# Patient Record
Sex: MALE | Race: WHITE | NOT HISPANIC OR LATINO | Employment: FULL TIME | URBAN - METROPOLITAN AREA
[De-identification: names, ages, dates, MRNs, and addresses within clinical notes are randomized per-mention and may not be internally consistent; named-entity substitution may affect disease eponyms.]

---

## 2019-02-28 ENCOUNTER — HOSPITAL ENCOUNTER (OUTPATIENT)
Dept: SURGERY | Facility: HOSPITAL | Age: 84
Setting detail: HOSPITAL OUTPATIENT SURGERY
Discharge: HOME | End: 2019-02-28
Attending: OPHTHALMOLOGY

## 2019-03-07 ENCOUNTER — HOSPITAL ENCOUNTER (OUTPATIENT)
Dept: SURGERY | Facility: HOSPITAL | Age: 84
Setting detail: HOSPITAL OUTPATIENT SURGERY
Discharge: HOME | End: 2019-03-07
Attending: OPHTHALMOLOGY

## 2019-04-11 ENCOUNTER — HOSPITAL ENCOUNTER (OUTPATIENT)
Dept: LAB | Facility: HOSPITAL | Age: 84
Discharge: HOME | End: 2019-04-11
Attending: PHYSICIAN ASSISTANT

## 2019-04-11 LAB
ALBUMIN SERPL-MCNC: 4 G/DL (ref 3.5–5)
ALBUMIN/GLOB SERPL: 1.4 {RATIO} (ref 1.4–2.6)
ALP SERPL-CCNC: 68 U/L (ref 56–155)
ALT SERPL-CCNC: 18 U/L (ref 10–40)
ANION GAP SERPL CALC-SCNC: 16 MMOL/L (ref 8–19)
AST SERPL-CCNC: 21 U/L (ref 15–50)
BASOPHILS # BLD AUTO: 0.03 10*3/UL (ref 0–0.2)
BASOPHILS NFR BLD AUTO: 0.5 % (ref 0–3)
BILIRUB SERPL-MCNC: 0.64 MG/DL (ref 0.2–1.3)
BUN SERPL-MCNC: 18 MG/DL (ref 5–25)
BUN/CREAT SERPL: 19 {RATIO} (ref 6–20)
CALCIUM SERPL-MCNC: 9 MG/DL (ref 8.7–10.4)
CHLORIDE SERPL-SCNC: 100 MMOL/L (ref 99–111)
CONV ABS IMM GRAN: 0.02 10*3/UL (ref 0–0.2)
CONV CO2: 28 MMOL/L (ref 22–32)
CONV IMMATURE GRAN: 0.3 % (ref 0–1.8)
CONV TOTAL PROTEIN: 6.8 G/DL (ref 6.3–8.2)
CREAT UR-MCNC: 0.95 MG/DL (ref 0.7–1.2)
DEPRECATED RDW RBC AUTO: 48.2 FL (ref 35.1–43.9)
EOSINOPHIL # BLD AUTO: 0.1 10*3/UL (ref 0–0.7)
EOSINOPHIL # BLD AUTO: 1.5 % (ref 0–7)
ERYTHROCYTE [DISTWIDTH] IN BLOOD BY AUTOMATED COUNT: 13.1 % (ref 11.6–14.4)
GFR SERPLBLD BASED ON 1.73 SQ M-ARVRAT: >60 ML/MIN/{1.73_M2}
GLOBULIN UR ELPH-MCNC: 2.8 G/DL (ref 2–3.5)
GLUCOSE SERPL-MCNC: 79 MG/DL (ref 70–99)
HBA1C MFR BLD: 13.6 G/DL (ref 14–18)
HCT VFR BLD AUTO: 41.6 % (ref 42–52)
LYMPHOCYTES # BLD AUTO: 2.12 10*3/UL (ref 1–5)
MCH RBC QN AUTO: 32.7 PG (ref 27–31)
MCHC RBC AUTO-ENTMCNC: 32.7 G/DL (ref 33–37)
MCV RBC AUTO: 100 FL (ref 80–96)
MONOCYTES # BLD AUTO: 0.49 10*3/UL (ref 0.2–1.2)
MONOCYTES NFR BLD AUTO: 7.6 % (ref 3–10)
NEUTROPHILS # BLD AUTO: 3.7 10*3/UL (ref 2–8)
NEUTROPHILS NFR BLD AUTO: 57.3 % (ref 30–85)
NRBC CBCN: 0 % (ref 0–0.7)
OSMOLALITY SERPL CALC.SUM OF ELEC: 289 MOSM/KG (ref 273–304)
PLATELET # BLD AUTO: 218 10*3/UL (ref 130–400)
PMV BLD AUTO: 11.2 FL (ref 9.4–12.4)
POTASSIUM SERPL-SCNC: 4.6 MMOL/L (ref 3.5–5.3)
RBC # BLD AUTO: 4.16 10*6/UL (ref 4.7–6.1)
SODIUM SERPL-SCNC: 139 MMOL/L (ref 135–147)
T4 FREE SERPL-MCNC: 1.5 NG/DL (ref 0.9–1.8)
TSH SERPL-ACNC: 2.83 M[IU]/L (ref 0.27–4.2)
URATE SERPL-MCNC: 6.7 MG/DL (ref 3.5–8.5)
VARIANT LYMPHS NFR BLD MANUAL: 32.8 % (ref 20–45)
WBC # BLD AUTO: 6.46 10*3/UL (ref 4.8–10.8)

## 2019-07-22 ENCOUNTER — HOSPITAL ENCOUNTER (OUTPATIENT)
Dept: LAB | Facility: HOSPITAL | Age: 84
Discharge: HOME | End: 2019-07-22
Attending: PHYSICIAN ASSISTANT

## 2019-07-22 LAB
BASOPHILS # BLD AUTO: 0.03 10*3/UL (ref 0–0.2)
BASOPHILS NFR BLD AUTO: 0.6 % (ref 0–3)
CONV ABS IMM GRAN: 0.01 10*3/UL (ref 0–0.2)
CONV IMMATURE GRAN: 0.2 % (ref 0–1.8)
DEPRECATED RDW RBC AUTO: 48.6 FL (ref 35.1–43.9)
EOSINOPHIL # BLD AUTO: 0.12 10*3/UL (ref 0–0.7)
EOSINOPHIL # BLD AUTO: 2.2 % (ref 0–7)
ERYTHROCYTE [DISTWIDTH] IN BLOOD BY AUTOMATED COUNT: 13.4 % (ref 11.6–14.4)
FOLATE SERPL-MCNC: 14.5 NG/ML (ref 4.8–20)
HBA1C MFR BLD: 12.9 G/DL (ref 14–18)
HCT VFR BLD AUTO: 38.1 % (ref 42–52)
IRON SATN MFR SERPL: 30 % (ref 20–55)
IRON SERPL-MCNC: 92 UG/DL (ref 70–180)
LYMPHOCYTES # BLD AUTO: 1.84 10*3/UL (ref 1–5)
MCH RBC QN AUTO: 33.5 PG (ref 27–31)
MCHC RBC AUTO-ENTMCNC: 33.9 G/DL (ref 33–37)
MCV RBC AUTO: 99 FL (ref 80–96)
MONOCYTES # BLD AUTO: 0.5 10*3/UL (ref 0.2–1.2)
MONOCYTES NFR BLD AUTO: 9.4 % (ref 3–10)
NEUTROPHILS # BLD AUTO: 2.84 10*3/UL (ref 2–8)
NEUTROPHILS NFR BLD AUTO: 53.1 % (ref 30–85)
NRBC CBCN: 0 % (ref 0–0.7)
PLATELET # BLD AUTO: 188 10*3/UL (ref 130–400)
PMV BLD AUTO: 11.1 FL (ref 9.4–12.4)
RBC # BLD AUTO: 3.85 10*6/UL (ref 4.7–6.1)
TIBC SERPL-MCNC: 305 UG/DL (ref 245–450)
TRANSFERRIN SERPL-MCNC: 213 MG/DL (ref 215–365)
VARIANT LYMPHS NFR BLD MANUAL: 34.5 % (ref 20–45)
VIT B12 SERPL-MCNC: 412 PG/ML (ref 211–911)
WBC # BLD AUTO: 5.34 10*3/UL (ref 4.8–10.8)

## 2019-07-29 ENCOUNTER — HOSPITAL ENCOUNTER (OUTPATIENT)
Dept: LAB | Facility: HOSPITAL | Age: 84
Discharge: HOME | End: 2019-07-29
Attending: PHYSICIAN ASSISTANT

## 2019-08-26 ENCOUNTER — HOSPITAL ENCOUNTER (OUTPATIENT)
Dept: LAB | Facility: HOSPITAL | Age: 84
Discharge: HOME | End: 2019-08-26
Attending: PHYSICIAN ASSISTANT

## 2019-08-26 LAB
BASOPHILS # BLD AUTO: 0.04 10*3/UL (ref 0–0.2)
BASOPHILS NFR BLD AUTO: 0.6 % (ref 0–3)
CONV ABS IMM GRAN: 0.01 10*3/UL (ref 0–0.2)
CONV IMMATURE GRAN: 0.2 % (ref 0–1.8)
DEPRECATED RDW RBC AUTO: 47.4 FL (ref 35.1–43.9)
EOSINOPHIL # BLD AUTO: 0.28 10*3/UL (ref 0–0.7)
EOSINOPHIL # BLD AUTO: 4.4 % (ref 0–7)
ERYTHROCYTE [DISTWIDTH] IN BLOOD BY AUTOMATED COUNT: 13.2 % (ref 11.6–14.4)
HCT VFR BLD AUTO: 39.8 % (ref 42–52)
HGB BLD-MCNC: 13.2 G/DL (ref 14–18)
LYMPHOCYTES # BLD AUTO: 2.1 10*3/UL (ref 1–5)
LYMPHOCYTES NFR BLD AUTO: 32.7 % (ref 20–45)
MCH RBC QN AUTO: 32.8 PG (ref 27–31)
MCHC RBC AUTO-ENTMCNC: 33.2 G/DL (ref 33–37)
MCV RBC AUTO: 99 FL (ref 80–96)
MONOCYTES # BLD AUTO: 0.47 10*3/UL (ref 0.2–1.2)
MONOCYTES NFR BLD AUTO: 7.3 % (ref 3–10)
NEUTROPHILS # BLD AUTO: 3.52 10*3/UL (ref 2–8)
NEUTROPHILS NFR BLD AUTO: 54.8 % (ref 30–85)
NRBC CBCN: 0 % (ref 0–0.7)
PLATELET # BLD AUTO: 204 10*3/UL (ref 130–400)
PMV BLD AUTO: 11.2 FL (ref 9.4–12.4)
RBC # BLD AUTO: 4.02 10*6/UL (ref 4.7–6.1)
WBC # BLD AUTO: 6.42 10*3/UL (ref 4.8–10.8)

## 2019-10-10 ENCOUNTER — HOSPITAL ENCOUNTER (OUTPATIENT)
Dept: LAB | Facility: HOSPITAL | Age: 84
Discharge: HOME | End: 2019-10-10
Attending: PHYSICIAN ASSISTANT

## 2019-10-10 LAB
25(OH)D3 SERPL-MCNC: 54.5 NG/ML (ref 30–100)
ALBUMIN SERPL-MCNC: 4.2 G/DL (ref 3.5–5)
ALBUMIN/GLOB SERPL: 1.4 {RATIO} (ref 1.4–2.6)
ALP SERPL-CCNC: 82 U/L (ref 56–155)
ALT SERPL-CCNC: 24 U/L (ref 10–40)
ANION GAP SERPL CALC-SCNC: 16 MMOL/L (ref 8–19)
AST SERPL-CCNC: 23 U/L (ref 15–50)
BASOPHILS # BLD AUTO: 0.03 10*3/UL (ref 0–0.2)
BASOPHILS NFR BLD AUTO: 0.4 % (ref 0–3)
BILIRUB SERPL-MCNC: 0.91 MG/DL (ref 0.2–1.3)
BUN SERPL-MCNC: 14 MG/DL (ref 5–25)
BUN/CREAT SERPL: 14 {RATIO} (ref 6–20)
CALCIUM SERPL-MCNC: 9.5 MG/DL (ref 8.7–10.4)
CHLORIDE SERPL-SCNC: 100 MMOL/L (ref 99–111)
CHOLEST SERPL-MCNC: 109 MG/DL (ref 107–200)
CHOLEST/HDLC SERPL: 2.5 {RATIO} (ref 3–6)
CONV ABS IMM GRAN: 0.03 10*3/UL (ref 0–0.2)
CONV CO2: 27 MMOL/L (ref 22–32)
CONV IMMATURE GRAN: 0.4 % (ref 0–1.8)
CONV TOTAL PROTEIN: 7.2 G/DL (ref 6.3–8.2)
CREAT UR-MCNC: 1 MG/DL (ref 0.7–1.2)
DEPRECATED RDW RBC AUTO: 48.4 FL (ref 35.1–43.9)
EOSINOPHIL # BLD AUTO: 0.13 10*3/UL (ref 0–0.7)
EOSINOPHIL # BLD AUTO: 1.9 % (ref 0–7)
ERYTHROCYTE [DISTWIDTH] IN BLOOD BY AUTOMATED COUNT: 13.3 % (ref 11.6–14.4)
FOLATE SERPL-MCNC: 15 NG/ML (ref 4.8–20)
GFR SERPLBLD BASED ON 1.73 SQ M-ARVRAT: >60 ML/MIN/{1.73_M2}
GLOBULIN UR ELPH-MCNC: 3 G/DL (ref 2–3.5)
GLUCOSE SERPL-MCNC: 88 MG/DL (ref 70–99)
HCT VFR BLD AUTO: 41.1 % (ref 42–52)
HDLC SERPL-MCNC: 44 MG/DL (ref 40–60)
HGB BLD-MCNC: 13.7 G/DL (ref 14–18)
IRON SATN MFR SERPL: 48 % (ref 20–55)
IRON SERPL-MCNC: 149 UG/DL (ref 70–180)
LDLC SERPL CALC-MCNC: 50 MG/DL (ref 70–100)
LYMPHOCYTES # BLD AUTO: 2.34 10*3/UL (ref 1–5)
LYMPHOCYTES NFR BLD AUTO: 34.1 % (ref 20–45)
MCH RBC QN AUTO: 33 PG (ref 27–31)
MCHC RBC AUTO-ENTMCNC: 33.3 G/DL (ref 33–37)
MCV RBC AUTO: 99 FL (ref 80–96)
MONOCYTES # BLD AUTO: 0.49 10*3/UL (ref 0.2–1.2)
MONOCYTES NFR BLD AUTO: 7.1 % (ref 3–10)
NEUTROPHILS # BLD AUTO: 3.84 10*3/UL (ref 2–8)
NEUTROPHILS NFR BLD AUTO: 56.1 % (ref 30–85)
NRBC CBCN: 0 % (ref 0–0.7)
OSMOLALITY SERPL CALC.SUM OF ELEC: 288 MOSM/KG (ref 273–304)
PLATELET # BLD AUTO: 221 10*3/UL (ref 130–400)
PMV BLD AUTO: 11 FL (ref 9.4–12.4)
POTASSIUM SERPL-SCNC: 4.4 MMOL/L (ref 3.5–5.3)
RBC # BLD AUTO: 4.15 10*6/UL (ref 4.7–6.1)
SODIUM SERPL-SCNC: 139 MMOL/L (ref 135–147)
T4 FREE SERPL-MCNC: 1.4 NG/DL (ref 0.9–1.8)
TIBC SERPL-MCNC: 313 UG/DL (ref 245–450)
TRANSFERRIN SERPL-MCNC: 219 MG/DL (ref 215–365)
TRIGL SERPL-MCNC: 74 MG/DL (ref 40–150)
TSH SERPL-ACNC: 3.68 M[IU]/L (ref 0.27–4.2)
VIT B12 SERPL-MCNC: 466 PG/ML (ref 211–911)
VLDLC SERPL-MCNC: 15 MG/DL (ref 5–37)
WBC # BLD AUTO: 6.86 10*3/UL (ref 4.8–10.8)

## 2020-06-12 ENCOUNTER — HOSPITAL ENCOUNTER (OUTPATIENT)
Dept: LAB | Facility: HOSPITAL | Age: 85
Discharge: HOME | End: 2020-06-12
Attending: PHYSICIAN ASSISTANT

## 2020-06-12 LAB
25(OH)D3 SERPL-MCNC: 43 NG/ML (ref 30–100)
ALBUMIN SERPL-MCNC: 4.1 G/DL (ref 3.5–5)
ALBUMIN/GLOB SERPL: 1.5 {RATIO} (ref 1.4–2.6)
ALP SERPL-CCNC: 84 U/L (ref 56–155)
ALT SERPL-CCNC: 15 U/L (ref 10–40)
ANION GAP SERPL CALC-SCNC: 16 MMOL/L (ref 8–19)
AST SERPL-CCNC: 20 U/L (ref 15–50)
BASOPHILS # BLD AUTO: 0.03 10*3/UL (ref 0–0.2)
BASOPHILS NFR BLD AUTO: 0.5 % (ref 0–3)
BILIRUB SERPL-MCNC: 0.71 MG/DL (ref 0.2–1.3)
BUN SERPL-MCNC: 27 MG/DL (ref 5–25)
BUN/CREAT SERPL: 25 {RATIO} (ref 6–20)
CALCIUM SERPL-MCNC: 9.1 MG/DL (ref 8.7–10.4)
CHLORIDE SERPL-SCNC: 103 MMOL/L (ref 99–111)
CHOLEST SERPL-MCNC: 107 MG/DL (ref 107–200)
CHOLEST/HDLC SERPL: 3 {RATIO} (ref 3–6)
CONV ABS IMM GRAN: 0.03 10*3/UL (ref 0–0.2)
CONV CO2: 24 MMOL/L (ref 22–32)
CONV IMMATURE GRAN: 0.5 % (ref 0–1.8)
CONV TOTAL PROTEIN: 6.9 G/DL (ref 6.3–8.2)
CREAT UR-MCNC: 1.07 MG/DL (ref 0.7–1.2)
DEPRECATED RDW RBC AUTO: 47.8 FL (ref 35.1–43.9)
EOSINOPHIL # BLD AUTO: 0.09 10*3/UL (ref 0–0.7)
EOSINOPHIL # BLD AUTO: 1.4 % (ref 0–7)
ERYTHROCYTE [DISTWIDTH] IN BLOOD BY AUTOMATED COUNT: 13.2 % (ref 11.6–14.4)
FOLATE SERPL-MCNC: 13.2 NG/ML (ref 4.8–20)
GFR SERPLBLD BASED ON 1.73 SQ M-ARVRAT: >60 ML/MIN/{1.73_M2}
GLOBULIN UR ELPH-MCNC: 2.8 G/DL (ref 2–3.5)
GLUCOSE SERPL-MCNC: 93 MG/DL (ref 70–99)
HCT VFR BLD AUTO: 40.4 % (ref 42–52)
HDLC SERPL-MCNC: 36 MG/DL (ref 40–60)
HGB BLD-MCNC: 13.4 G/DL (ref 14–18)
IRON SATN MFR SERPL: 32 % (ref 20–55)
IRON SERPL-MCNC: 99 UG/DL (ref 70–180)
LDLC SERPL CALC-MCNC: 61 MG/DL (ref 70–100)
LYMPHOCYTES # BLD AUTO: 2.07 10*3/UL (ref 1–5)
LYMPHOCYTES NFR BLD AUTO: 31.7 % (ref 20–45)
MCH RBC QN AUTO: 32.8 PG (ref 27–31)
MCHC RBC AUTO-ENTMCNC: 33.2 G/DL (ref 33–37)
MCV RBC AUTO: 98.8 FL (ref 80–96)
MONOCYTES # BLD AUTO: 0.47 10*3/UL (ref 0.2–1.2)
MONOCYTES NFR BLD AUTO: 7.2 % (ref 3–10)
NEUTROPHILS # BLD AUTO: 3.84 10*3/UL (ref 2–8)
NEUTROPHILS NFR BLD AUTO: 58.7 % (ref 30–85)
NRBC CBCN: 0 % (ref 0–0.7)
OSMOLALITY SERPL CALC.SUM OF ELEC: 293 MOSM/KG (ref 273–304)
PLATELET # BLD AUTO: 253 10*3/UL (ref 130–400)
PMV BLD AUTO: 10.8 FL (ref 9.4–12.4)
POTASSIUM SERPL-SCNC: 4.2 MMOL/L (ref 3.5–5.3)
RBC # BLD AUTO: 4.09 10*6/UL (ref 4.7–6.1)
SODIUM SERPL-SCNC: 139 MMOL/L (ref 135–147)
T4 FREE SERPL-MCNC: 1.4 NG/DL (ref 0.9–1.8)
TIBC SERPL-MCNC: 309 UG/DL (ref 245–450)
TRANSFERRIN SERPL-MCNC: 216 MG/DL (ref 215–365)
TRIGL SERPL-MCNC: 48 MG/DL (ref 40–150)
TSH SERPL-ACNC: 2.22 M[IU]/L (ref 0.27–4.2)
VIT B12 SERPL-MCNC: 395 PG/ML (ref 211–911)
VLDLC SERPL-MCNC: 10 MG/DL (ref 5–37)
WBC # BLD AUTO: 6.53 10*3/UL (ref 4.8–10.8)

## 2020-06-30 ENCOUNTER — HOSPITAL ENCOUNTER (OUTPATIENT)
Dept: OTHER | Facility: HOSPITAL | Age: 85
Discharge: HOME | End: 2020-06-30
Attending: INTERNAL MEDICINE

## 2020-06-30 LAB
FOLATE SERPL-MCNC: 12 NG/ML (ref 4.8–20)
VIT B12 SERPL-MCNC: 626 PG/ML (ref 211–911)

## 2020-12-03 ENCOUNTER — HOSPITAL ENCOUNTER (OUTPATIENT)
Dept: LAB | Facility: HOSPITAL | Age: 85
Discharge: HOME | End: 2020-12-03
Attending: PHYSICIAN ASSISTANT

## 2020-12-03 LAB
25(OH)D3 SERPL-MCNC: 52.2 NG/ML (ref 30–100)
ALBUMIN SERPL-MCNC: 4.1 G/DL (ref 3.5–5)
ALBUMIN/GLOB SERPL: 1.3 {RATIO} (ref 1.4–2.6)
ALP SERPL-CCNC: 121 U/L (ref 56–155)
ALT SERPL-CCNC: 29 U/L (ref 10–40)
ANION GAP SERPL CALC-SCNC: 15 MMOL/L (ref 8–19)
AST SERPL-CCNC: 30 U/L (ref 15–50)
BASOPHILS # BLD AUTO: 0.03 10*3/UL (ref 0–0.2)
BASOPHILS NFR BLD AUTO: 0.4 % (ref 0–3)
BILIRUB SERPL-MCNC: 0.78 MG/DL (ref 0.2–1.3)
BUN SERPL-MCNC: 20 MG/DL (ref 5–25)
BUN/CREAT SERPL: 19 {RATIO} (ref 6–20)
CALCIUM SERPL-MCNC: 9.7 MG/DL (ref 8.7–10.4)
CHLORIDE SERPL-SCNC: 100 MMOL/L (ref 99–111)
CHOLEST SERPL-MCNC: 95 MG/DL (ref 107–200)
CHOLEST/HDLC SERPL: 2.1 {RATIO} (ref 3–6)
CONV ABS IMM GRAN: 0.02 10*3/UL (ref 0–0.2)
CONV CO2: 28 MMOL/L (ref 22–32)
CONV IMMATURE GRAN: 0.3 % (ref 0–1.8)
CONV TOTAL PROTEIN: 7.3 G/DL (ref 6.3–8.2)
CREAT UR-MCNC: 1.06 MG/DL (ref 0.7–1.2)
DEPRECATED RDW RBC AUTO: 48.7 FL (ref 35.1–43.9)
EOSINOPHIL # BLD AUTO: 0.21 10*3/UL (ref 0–0.7)
EOSINOPHIL # BLD AUTO: 2.8 % (ref 0–7)
ERYTHROCYTE [DISTWIDTH] IN BLOOD BY AUTOMATED COUNT: 13.4 % (ref 11.6–14.4)
GFR SERPLBLD BASED ON 1.73 SQ M-ARVRAT: >60 ML/MIN/{1.73_M2}
GLOBULIN UR ELPH-MCNC: 3.2 G/DL (ref 2–3.5)
GLUCOSE SERPL-MCNC: 94 MG/DL (ref 70–99)
HCT VFR BLD AUTO: 42.7 % (ref 42–52)
HDLC SERPL-MCNC: 46 MG/DL (ref 40–60)
HGB BLD-MCNC: 14.1 G/DL (ref 14–18)
LDLC SERPL CALC-MCNC: 39 MG/DL (ref 70–100)
LYMPHOCYTES # BLD AUTO: 2.17 10*3/UL (ref 1–5)
LYMPHOCYTES NFR BLD AUTO: 29 % (ref 20–45)
MCH RBC QN AUTO: 32.9 PG (ref 27–31)
MCHC RBC AUTO-ENTMCNC: 33 G/DL (ref 33–37)
MCV RBC AUTO: 99.5 FL (ref 80–96)
MONOCYTES # BLD AUTO: 0.49 10*3/UL (ref 0.2–1.2)
MONOCYTES NFR BLD AUTO: 6.6 % (ref 3–10)
NEUTROPHILS # BLD AUTO: 4.55 10*3/UL (ref 2–8)
NEUTROPHILS NFR BLD AUTO: 60.9 % (ref 30–85)
NRBC CBCN: 0 % (ref 0–0.7)
OSMOLALITY SERPL CALC.SUM OF ELEC: 288 MOSM/KG (ref 273–304)
PLATELET # BLD AUTO: 294 10*3/UL (ref 130–400)
PMV BLD AUTO: 10.5 FL (ref 9.4–12.4)
POTASSIUM SERPL-SCNC: 4.5 MMOL/L (ref 3.5–5.3)
RBC # BLD AUTO: 4.29 10*6/UL (ref 4.7–6.1)
SODIUM SERPL-SCNC: 138 MMOL/L (ref 135–147)
T4 FREE SERPL-MCNC: 1.3 NG/DL (ref 0.9–1.8)
TRIGL SERPL-MCNC: 51 MG/DL (ref 40–150)
TSH SERPL-ACNC: 2.88 M[IU]/L (ref 0.27–4.2)
VLDLC SERPL-MCNC: 10 MG/DL (ref 5–37)
WBC # BLD AUTO: 7.47 10*3/UL (ref 4.8–10.8)

## 2020-12-03 PROCEDURE — 82607 VITAMIN B-12: CPT

## 2020-12-03 PROCEDURE — 82746 ASSAY OF FOLIC ACID SERUM: CPT

## 2020-12-04 ENCOUNTER — LAB REQUISITION (OUTPATIENT)
Dept: LAB | Facility: HOSPITAL | Age: 85
End: 2020-12-04

## 2020-12-04 DIAGNOSIS — Z00.00 ROUTINE GENERAL MEDICAL EXAMINATION AT A HEALTH CARE FACILITY: ICD-10-CM

## 2020-12-04 LAB
FOLATE SERPL-MCNC: 11.6 NG/ML (ref 4.78–24.2)
VIT B12 BLD-MCNC: 966 PG/ML (ref 211–946)

## 2021-01-01 ENCOUNTER — HOSPITAL ENCOUNTER (OUTPATIENT)
Dept: OTHER | Facility: HOSPITAL | Age: 86
Discharge: HOME | End: 2021-01-27
Attending: INTERNAL MEDICINE

## 2021-01-01 ENCOUNTER — TELEPHONE (OUTPATIENT)
Dept: FAMILY MEDICINE CLINIC | Facility: CLINIC | Age: 86
End: 2021-01-01

## 2021-01-01 ENCOUNTER — OFFICE VISIT (OUTPATIENT)
Dept: FAMILY MEDICINE CLINIC | Facility: CLINIC | Age: 86
End: 2021-01-01

## 2021-01-01 ENCOUNTER — OFFICE VISIT CONVERTED (OUTPATIENT)
Dept: ORTHOPEDIC SURGERY | Facility: CLINIC | Age: 86
End: 2021-01-01
Attending: ORTHOPAEDIC SURGERY

## 2021-01-01 ENCOUNTER — HOSPITAL ENCOUNTER (OUTPATIENT)
Dept: GENERAL RADIOLOGY | Facility: HOSPITAL | Age: 86
Discharge: HOME | End: 2021-10-12
Admitting: NURSE PRACTITIONER

## 2021-01-01 ENCOUNTER — HOSPITAL ENCOUNTER (OUTPATIENT)
Dept: GASTROENTEROLOGY | Facility: HOSPITAL | Age: 86
Discharge: HOME | End: 2021-02-10
Attending: PHYSICIAN ASSISTANT

## 2021-01-01 ENCOUNTER — LAB (OUTPATIENT)
Dept: LAB | Facility: HOSPITAL | Age: 86
End: 2021-01-01

## 2021-01-01 ENCOUNTER — HOSPITAL ENCOUNTER (OUTPATIENT)
Dept: URGENT CARE | Facility: CLINIC | Age: 86
Discharge: HOME | End: 2021-02-05
Attending: EMERGENCY MEDICINE

## 2021-01-01 ENCOUNTER — HOSPITAL ENCOUNTER (OUTPATIENT)
Dept: GENERAL RADIOLOGY | Facility: HOSPITAL | Age: 86
Discharge: HOME | End: 2021-03-08
Attending: PHYSICIAN ASSISTANT

## 2021-01-01 ENCOUNTER — CLINICAL SUPPORT (OUTPATIENT)
Dept: FAMILY MEDICINE CLINIC | Facility: CLINIC | Age: 86
End: 2021-01-01

## 2021-01-01 VITALS — HEART RATE: 88 BPM | HEIGHT: 74 IN | WEIGHT: 180 LBS | OXYGEN SATURATION: 96 % | BODY MASS INDEX: 23.1 KG/M2

## 2021-01-01 VITALS
HEART RATE: 84 BPM | OXYGEN SATURATION: 97 % | WEIGHT: 190.2 LBS | SYSTOLIC BLOOD PRESSURE: 130 MMHG | BODY MASS INDEX: 24.41 KG/M2 | HEIGHT: 74 IN | DIASTOLIC BLOOD PRESSURE: 70 MMHG | TEMPERATURE: 97.6 F

## 2021-01-01 VITALS
HEART RATE: 100 BPM | BODY MASS INDEX: 23.49 KG/M2 | RESPIRATION RATE: 20 BRPM | WEIGHT: 183 LBS | HEIGHT: 74 IN | DIASTOLIC BLOOD PRESSURE: 78 MMHG | OXYGEN SATURATION: 100 % | TEMPERATURE: 97.3 F | SYSTOLIC BLOOD PRESSURE: 140 MMHG

## 2021-01-01 DIAGNOSIS — I10 ESSENTIAL HYPERTENSION: Primary | ICD-10-CM

## 2021-01-01 DIAGNOSIS — E78.2 MIXED HYPERLIPIDEMIA: ICD-10-CM

## 2021-01-01 DIAGNOSIS — I10 ESSENTIAL HYPERTENSION: ICD-10-CM

## 2021-01-01 DIAGNOSIS — E55.9 VITAMIN D DEFICIENCY: ICD-10-CM

## 2021-01-01 DIAGNOSIS — M79.642 LEFT HAND PAIN: ICD-10-CM
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
## 2021-01-01 DIAGNOSIS — R53.82 CHRONIC FATIGUE: ICD-10-CM

## 2021-01-01 DIAGNOSIS — S62.652A CLOSED NONDISPLACED FRACTURE OF MIDDLE PHALANX OF RIGHT MIDDLE FINGER, INITIAL ENCOUNTER: ICD-10-CM

## 2021-01-01 DIAGNOSIS — Z23 NEED FOR INFLUENZA VACCINATION: Primary | ICD-10-CM

## 2021-01-01 DIAGNOSIS — M79.642 LEFT HAND PAIN: Primary | ICD-10-CM

## 2021-01-01 DIAGNOSIS — D50.9 IRON DEFICIENCY ANEMIA, UNSPECIFIED IRON DEFICIENCY ANEMIA TYPE: ICD-10-CM

## 2021-01-01 LAB
25(OH)D3 SERPL-MCNC: 43.5 NG/ML (ref 30–100)
ALBUMIN SERPL-MCNC: 4 G/DL (ref 3.5–5.2)
ALBUMIN/GLOB SERPL: 1.6 G/DL
ALP SERPL-CCNC: 70 U/L (ref 39–117)
ALT SERPL W P-5'-P-CCNC: 16 U/L (ref 1–41)
ANION GAP SERPL CALCULATED.3IONS-SCNC: 9.2 MMOL/L (ref 5–15)
AST SERPL-CCNC: 23 U/L (ref 1–40)
BASOPHILS # BLD AUTO: 0.03 10*3/MM3 (ref 0–0.2)
BASOPHILS NFR BLD AUTO: 0.6 % (ref 0–1.5)
BILIRUB SERPL-MCNC: 0.8 MG/DL (ref 0–1.2)
BUN SERPL-MCNC: 20 MG/DL (ref 8–23)
BUN/CREAT SERPL: 18.5 (ref 7–25)
CALCIUM SPEC-SCNC: 9.4 MG/DL (ref 8.6–10.5)
CHLORIDE SERPL-SCNC: 102 MMOL/L (ref 98–107)
CHOLEST SERPL-MCNC: 127 MG/DL (ref 0–200)
CO2 SERPL-SCNC: 27.8 MMOL/L (ref 22–29)
CREAT SERPL-MCNC: 1.08 MG/DL (ref 0.76–1.27)
DEPRECATED RDW RBC AUTO: 44.2 FL (ref 37–54)
EOSINOPHIL # BLD AUTO: 0.1 10*3/MM3 (ref 0–0.4)
EOSINOPHIL NFR BLD AUTO: 1.9 % (ref 0.3–6.2)
ERYTHROCYTE [DISTWIDTH] IN BLOOD BY AUTOMATED COUNT: 12.4 % (ref 12.3–15.4)
GFR SERPL CREATININE-BSD FRML MDRD: 65 ML/MIN/1.73
GLOBULIN UR ELPH-MCNC: 2.5 GM/DL
GLUCOSE SERPL-MCNC: 82 MG/DL (ref 65–99)
HCT VFR BLD AUTO: 38.6 % (ref 37.5–51)
HDLC SERPL-MCNC: 39 MG/DL (ref 40–60)
HGB BLD-MCNC: 13.3 G/DL (ref 13–17.7)
IMM GRANULOCYTES # BLD AUTO: 0.02 10*3/MM3 (ref 0–0.05)
IMM GRANULOCYTES NFR BLD AUTO: 0.4 % (ref 0–0.5)
IRON 24H UR-MRATE: 99 MCG/DL (ref 59–158)
LDLC SERPL CALC-MCNC: 75 MG/DL (ref 0–100)
LDLC/HDLC SERPL: 1.95 {RATIO}
LYMPHOCYTES # BLD AUTO: 1.92 10*3/MM3 (ref 0.7–3.1)
LYMPHOCYTES NFR BLD AUTO: 36 % (ref 19.6–45.3)
MCH RBC QN AUTO: 33.5 PG (ref 26.6–33)
MCHC RBC AUTO-ENTMCNC: 34.5 G/DL (ref 31.5–35.7)
MCV RBC AUTO: 97.2 FL (ref 79–97)
MONOCYTES # BLD AUTO: 0.36 10*3/MM3 (ref 0.1–0.9)
MONOCYTES NFR BLD AUTO: 6.7 % (ref 5–12)
NEUTROPHILS NFR BLD AUTO: 2.91 10*3/MM3 (ref 1.7–7)
NEUTROPHILS NFR BLD AUTO: 54.4 % (ref 42.7–76)
NRBC BLD AUTO-RTO: 0 /100 WBC (ref 0–0.2)
PLATELET # BLD AUTO: 218 10*3/MM3 (ref 140–450)
PMV BLD AUTO: 11 FL (ref 6–12)
POTASSIUM SERPL-SCNC: 4.8 MMOL/L (ref 3.5–5.2)
PROT SERPL-MCNC: 6.5 G/DL (ref 6–8.5)
RBC # BLD AUTO: 3.97 10*6/MM3 (ref 4.14–5.8)
SARS-COV-2 RNA SPEC QL NAA+PROBE: DETECTED
SODIUM SERPL-SCNC: 139 MMOL/L (ref 136–145)
T4 FREE SERPL-MCNC: 1.2 NG/DL (ref 0.93–1.7)
TRIGL SERPL-MCNC: 60 MG/DL (ref 0–150)
TSH SERPL DL<=0.05 MIU/L-ACNC: 1.86 UIU/ML (ref 0.27–4.2)
VIT B12 BLD-MCNC: 609 PG/ML (ref 211–946)
VLDLC SERPL-MCNC: 13 MG/DL (ref 5–40)
WBC # BLD AUTO: 5.34 10*3/MM3 (ref 3.4–10.8)

## 2021-01-01 PROCEDURE — G0008 ADMIN INFLUENZA VIRUS VAC: HCPCS | Performed by: FAMILY MEDICINE

## 2021-01-01 PROCEDURE — 80053 COMPREHEN METABOLIC PANEL: CPT

## 2021-01-01 PROCEDURE — 82306 VITAMIN D 25 HYDROXY: CPT

## 2021-01-01 PROCEDURE — 90662 IIV NO PRSV INCREASED AG IM: CPT | Performed by: FAMILY MEDICINE

## 2021-01-01 PROCEDURE — 84439 ASSAY OF FREE THYROXINE: CPT

## 2021-01-01 PROCEDURE — 99213 OFFICE O/P EST LOW 20 MIN: CPT | Performed by: NURSE PRACTITIONER

## 2021-01-01 PROCEDURE — 36415 COLL VENOUS BLD VENIPUNCTURE: CPT

## 2021-01-01 PROCEDURE — 80061 LIPID PANEL: CPT

## 2021-01-01 PROCEDURE — 73130 X-RAY EXAM OF HAND: CPT

## 2021-01-01 PROCEDURE — 99204 OFFICE O/P NEW MOD 45 MIN: CPT | Performed by: FAMILY MEDICINE

## 2021-01-01 PROCEDURE — 83540 ASSAY OF IRON: CPT

## 2021-01-01 PROCEDURE — 85025 COMPLETE CBC W/AUTO DIFF WBC: CPT

## 2021-01-01 PROCEDURE — 82607 VITAMIN B-12: CPT

## 2021-01-01 PROCEDURE — 84443 ASSAY THYROID STIM HORMONE: CPT

## 2021-01-01 RX ORDER — OMEPRAZOLE 20 MG/1
20 CAPSULE, DELAYED RELEASE ORAL DAILY
COMMUNITY
Start: 2021-01-01 | End: 2021-01-01 | Stop reason: SDUPTHER

## 2021-01-01 RX ORDER — ASPIRIN 81 MG/1
81 TABLET, CHEWABLE ORAL DAILY
Qty: 90 TABLET | Refills: 2 | Status: SHIPPED | OUTPATIENT
Start: 2021-01-01 | End: 2022-01-10

## 2021-01-01 RX ORDER — OMEPRAZOLE 20 MG/1
20 CAPSULE, DELAYED RELEASE ORAL DAILY
Qty: 90 CAPSULE | Refills: 1 | Status: SHIPPED | OUTPATIENT
Start: 2021-01-01

## 2021-01-01 RX ORDER — LOSARTAN POTASSIUM 25 MG/1
25 TABLET ORAL DAILY
Qty: 90 TABLET | Refills: 3 | Status: SHIPPED | OUTPATIENT
Start: 2021-01-01

## 2021-01-01 RX ORDER — ASPIRIN 81 MG/1
81 TABLET, CHEWABLE ORAL DAILY
COMMUNITY
Start: 2021-01-01 | End: 2021-01-01 | Stop reason: SDUPTHER

## 2021-01-01 RX ORDER — LOSARTAN POTASSIUM 25 MG/1
25 TABLET ORAL DAILY
COMMUNITY
Start: 2021-01-01 | End: 2021-01-01 | Stop reason: SDUPTHER

## 2021-05-10 NOTE — H&P
History and Physical      Patient Name: Mariposa Paredes   Patient ID: [id number]   Sex: Male   YOB: 1935    Primary Care Provider: Jose Brunner MD   Referring Provider: Coty Caldwell PA-C    Visit Date: March 30, 2021    Provider: Nayeli Kovacs MD   Location: [facility]   Location Address: [address] [place] [address]  [id number]   Location Phone: [phone]          Chief Complaint  · Right Shoulder Pain      History Of Present Illness  Mariposa Paredes is a 85 year old /White male who presents today to [facility].      Patient presents today for an evaluation of right shoulder pain. He presents today using a cane for ambulation assistance. He states he has been increasing right shoulder pain for the last several months on the anterior aspect of his shoulder that radiates down his upper arm. He states he has good range of motion in his right shoulder but can't get the pain to subside. 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 He states pain in his shoulder after receiving a vitamin B12 shot. He states pain has gotten so bad he has to squeeze his right shoulder at night. The only relief he gets is putting a heating pad on his shoulder. He states pain is made worse when he is laying down.          Past Medical History  Anemia; Diverticulosis; GERD; Hypertension         Past Surgical History  Cholecystecomy; Colonoscopy; Hand surgery         Medication List  losartan 50 mg oral tablet; omeprazole 20 mg oral capsule,delayed release(DR/EC)         Allergy List  PENICILLINS       Allergies Reconciled  Family Medical History  *No Known Family History         Social History  Alcohol (Never); Tobacco (Former)         Review of Systems  · Constitutional  o Denies  o : fever, chills, weight loss  · Cardiovascular  o Denies  o : chest pain, shortness of breath  · Gastrointestinal  o Denies  o : liver disease,  "heartburn, nausea, blood in stools  · Genitourinary  o Denies  o : painful urination, blood in urine  · Integument  o Denies  o : rash, itching  · Neurologic  o Denies  o : headache, weakness, loss of consciousness  · Musculoskeletal  o Denies  o : painful, swollen joints  · Psychiatric  o Denies  o : drug/alcohol addiction, anxiety, depression      Vitals  Date Time BP Position Site L\R Cuff Size HR RR TEMP (F) WT  HT  BMI kg/m2 BSA m2 O2 Sat FR L/min FiO2        03/30/2021 03:48 PM      88 - R   180lbs 0oz 6'  2\" 23.11 2.06 96 %            Physical Examination  · Constitutional  o Appearance  o : well developed, well-nourished, no obvious deformities present  · Head and Face  o Head  o :   § Inspection  § : normocephalic  o Face  o :   § Inspection  § : no facial lesions  · Eyes  o Conjunctivae  o : conjunctivae normal  o Sclerae  o : sclerae white  · Ears, Nose, Mouth and Throat  o Ears  o :   § External Ears  § : appearance within normal limits  § Hearing  § : intact  o Nose  o :   § External Nose  § : appearance normal  · Neck  o Inspection/Palpation  o : normal appearance  o Range of Motion  o : full range of motion  · Respiratory  o Respiratory Effort  o : breathing unlabored  o Inspection of Chest  o : normal appearance  o Auscultation of Lungs  o : no audible wheezing or rales  · Cardiovascular  o Heart  o : regular rate  · Gastrointestinal  o Abdominal Examination  o : soft and non-tender  · Skin and Subcutaneous Tissue  o General Inspection  o : intact, no rashes  · Psychiatric  o General  o : Alert and oriented x3  o Judgement and Insight  o : judgment and insight intact  o Mood and Affect  o : mood normal, affect appropriate  · Right Shoulder  o Inspection  o : Sensation grossly intact. Neurovascular intact. Tender AC joint. Good tone of deltoid, biceps, triceps, wrist extensors, and wrist flexors. No swelling or skin discoloration. Radial pulse 2+, ulnar pulse 2+. ER to 45. Pain with abduction. " Non-tender elbow. Tender subacromial bursa. Skin intact. Full forward flexion. Full cross body adduction.   · Injection Note/Aspiration Note  o Site  o : right shoulder  o Procedure  o : Procedure: After educating the patient, patient gave consent for procedure. After using Chloraprep, the joint space was injected. The patient tolerated the procedure well.   o Medication  o : 80 mg of DepoMedrol with 9cc of 1% Xylocaine  · Imaging  o Imaging  o : 3/8/21 RIGHT SHOULDER X-RAY: Mild age-appropriate DJD at the AC joint, without radiographic evidence of acute fracture or dislocation.           Assessment  · Right shoulder pain, unspecified chronicity     719.41/M25.511      Plan  · Orders  o Depo-Medrol injection 80mg () - - 03/30/2021   Lot [id number] Exp 02 2022 [employer] Administered by PRIMITIVO Kovacs MD  o Shoulder Intra-articular Injection without US Guidance [facility] ([id number]) - - 03/30/2021   Lot [id number] Exp 04 2024 [employer] Administered by PRIMITIVO Kovacs MD  · Medications  o Medications have been Reconciled  o Transition of Care or Provider Policy  · Instructions  o Dr. Kovacs saw and examined the patient and agrees with plan.   o X-rays reviewed by Dr. Kovacs.  o Reviewed the patient's Past Medical, Social, and Family history as well as the ROS at today's visit, no changes.  o Call or return if worsening symptoms.  o Follow Up PRN.  o Discussed treatment plans and diagnosis with the patient. Patient opted for a right shoulder injection and tolerated it well.   o The above service was scribed by Yaa Cifuentes on my behalf and I attest to the accuracy of the note. mc            Electronically Signed by: Yaa Cifuentes-, Other -Author on April 1, 2021 01:55:09 PM  Electronically Co-signed by: Nayeli Kovacs MD -Reviewer on April 1, 2021 08:53:10 PM

## 2021-07-20 PROBLEM — E55.9 VITAMIN D DEFICIENCY: Status: ACTIVE | Noted: 2021-01-01

## 2021-07-20 PROBLEM — D50.9 IRON DEFICIENCY ANEMIA: Status: ACTIVE | Noted: 2021-01-01

## 2021-07-20 PROBLEM — E78.2 MIXED HYPERLIPIDEMIA: Status: ACTIVE | Noted: 2021-01-01

## 2021-07-20 PROBLEM — I10 ESSENTIAL HYPERTENSION: Status: ACTIVE | Noted: 2021-01-01

## 2021-07-20 PROBLEM — R53.82 CHRONIC FATIGUE: Status: ACTIVE | Noted: 2021-01-01

## 2021-07-20 NOTE — PROGRESS NOTES
"Chief Complaint  Establish Care (new patient ) and labs (routine )    Subjective          Mariposa Paredes presents to [facility]  History of Present Illness    Presents establish [facility] is with his son has some complaints of fatigue but otherwise doing well has history of HTN heartburn and stroke on aspirin losartan blood pressure well controlled no chest pain palpitations headache fever shortness of breath or other      Objective   Vital Signs:   /70   Pulse 84   Temp 97.6 °F (36.4 °C) (Temporal)   Ht 188 cm (74\")   Wt 86.3 kg (190 lb 3.2 oz)   SpO2 97%   BMI 24.42 kg/m²     Physical Exam  Vitals reviewed.   Constitutional:       Appearance: Normal appearance. He is well-developed.   HENT:      Head: Normocephalic and atraumatic.      Right Ear: External ear normal.      Left Ear: External ear normal.      Mouth/Throat:      Pharynx: No oropharyngeal exudate.   Eyes:      Conjunctiva/sclera: Conjunctivae normal.      Pupils: Pupils are equal, round, and reactive to light.   Cardiovascular:      Rate and Rhythm: Normal rate and regular rhythm.      Heart sounds: No murmur heard.   No friction rub. No gallop.    Pulmonary:      Effort: Pulmonary effort is normal.      Breath sounds: Normal breath sounds. No wheezing or rhonchi.   Abdominal:      General: Bowel sounds are normal. There is no distension.      Palpations: Abdomen is soft.      Tenderness: There is no abdominal tenderness.   Skin:     General: Skin is warm and dry.   Neurological:      Mental Status: He is alert and oriented to person, place, and time.      Cranial Nerves: No cranial nerve deficit.   Psychiatric:         Mood and Affect: Mood and affect normal.         Behavior: Behavior normal.         Thought Content: Thought content normal.         Judgment: Judgment normal.        Result Review :   The following data was reviewed by: Ryne Waddell DO on 07/20/2021:  Common labs    Common Labsle 12/3/20 " 12/3/20 12/3/20    0858 0858 0858   Glucose  94    BUN  20    Creatinine  1.06    Sodium  138    Potassium  4.5    Chloride  100    Calcium  9.7    Albumin  4.1    Total Bilirubin  0.78    Alkaline Phosphatase  121    AST (SGOT)  30    ALT (SGPT)  29    WBC 7.47     Hemoglobin 14.1     Hematocrit 42.7     Platelets 294     Total Cholesterol   95 (A)   Triglycerides   51   HDL Cholesterol   46   LDL Cholesterol    39 (A)   (A) Abnormal value       Comments are available for some flowsheets but are not being displayed.                  Patient reports having a PSA on 10/2016 within normal limits colonoscopy due as above may need to be repeated will make sure he has had one left in years Pneumovax 2003 and 2015 Shingrix is recommended           Assessment and Plan    Diagnoses and all orders for this visit:    1. Essential hypertension (Primary)  -     Comprehensive metabolic panel; Future  -     CBC w AUTO Differential; Future    2. Mixed hyperlipidemia  -     Lipid panel; Future    3. Vitamin D deficiency  -     Vitamin D 25 hydroxy; Future    4. Chronic fatigue  -     Vitamin B12; Future  -     Comprehensive metabolic panel; Future  -     Iron level; Future  -     TSH+Free T4; Future  -     Lipid panel; Future  -     CBC w AUTO Differential; Future    5. Iron deficiency anemia, unspecified iron deficiency anemia type  -     Iron level; Future    Other orders  -     Cancel: Thyroid Profile II; Future    Patient up-to-date needs labs in the next 6 months as above has some fatigue above lab work should help with investigating sooner follow-up if needed precautions given monitor blood pressure at home and continue with activity we will review past chart and go from there        Follow Up   Return in about 6 months (around 1/20/2022), or if symptoms worsen or fail to improve, for Medicare Wellness if due , Labs before.  Patient was given instructions and counseling regarding his condition or for health maintenance  advice. Please see specific information pulled into the AVS if appropriate.

## 2021-10-12 NOTE — PATIENT INSTRUCTIONS
Hand Pain  Many things can cause hand pain. Some common causes are:  · An injury.  · Repeating the same movement with your hand over and over (overuse).  · Osteoporosis.  · Arthritis.  · Lumps in the tendons or joints of the hand and wrist (ganglion cysts).  · Nerve compression syndromes (carpal tunnel syndrome).  · Inflammation of the tendons (tendinitis).  · Infection.  Follow these instructions at home:  Pay attention to any changes in your symptoms. Take these actions to help with your discomfort:  Managing pain, stiffness, and swelling    · Take over-the-counter and prescription medicines only as told by your health care provider.  · Wear a hand splint or support as told by your health care provider.  · If directed, put ice on the affected area:  ? Put ice in a plastic bag.  ? Place a towel between your skin and the bag.  ? Leave the ice on for 20 minutes, 2-3 times a day.    Activity  · Take breaks from repetitive activity often.  · Avoid activities that make your pain worse.  · Minimize stress on your hands and wrists as much as possible.  · Do stretches or exercises as told by your health care provider.  · Do not do activities that make your pain worse.  Contact a health care provider if:  · Your pain does not get better after a few days of self-care.  · Your pain gets worse.  · Your pain affects your ability to do your daily activities.  Get help right away if:  · Your hand becomes warm, red, or swollen.  · Your hand is numb or tingling.  · Your hand is extremely swollen or deformed.  · Your hand or fingers turn white or blue.  · You cannot move your hand, wrist, or fingers.  Summary  · Many things can cause hand pain.  · Contact your health care provider if your pain does not get better after a few days of self care.  · Minimize stress on your hands and wrists as much as possible.  · Do not do activities that make your pain worse.  This information is not intended to replace advice given to you by your  health care provider. Make sure you discuss any questions you have with your health care provider.  Document Revised: 09/13/2019 Document Reviewed: 09/13/2019  [employer] Patient Education © 2021 [employer]

## 2021-10-12 NOTE — ASSESSMENT & PLAN NOTE
Hypertension is stable..  Continue current treatment regimen.  Dietary sodium restriction.  Regular aerobic exercise.  Continue current medications.  Blood pressure will be reassessed at the next regular appointment.

## 2021-10-12 NOTE — PROGRESS NOTES
"Chief Complaint   Patient presents with   • Hand Pain     Left middle finger swollen and sore after a fall on 10/10/21       Subjective          Mariposa Paredes presents to [facility]    Pt presents with L hand pain following fall 2 days ago. States he tripped in his yard while walking to a neighbors house. Has not taken anything for pain. Denies any other injuries. Denies LOC or head injury. L middle finger is painful/swollen.       Past History:  Medical History: has a past medical history of Anemia, Arthritis, Condition not found, Diverticulosis, GERD (gastroesophageal reflux disease), HTN (hypertension), Reflux, Seasonal allergies, SOB (shortness of breath), and Stroke (HCC).   Surgical History: has a past surgical history that includes Cholecystectomy; Colonoscopy (2005); Gallbladder surgery; Hand surgery; and Hernia repair.   Family History: family history includes Arthritis in his son.   Social History: reports that he has quit smoking. He has never used smokeless tobacco. He reports that he does not drink alcohol and does not use drugs.  Allergies: Penicillin g and Penicillins  (Not in a hospital admission)       Social History     Socioeconomic History   • Marital status:    Tobacco Use   • Smoking status: Former Smoker   • Smokeless tobacco: Never Used   Vaping Use   • Vaping Use: Never used   Substance and Sexual Activity   • Alcohol use: Never   • Drug use: Never   • Sexual activity: Defer       Health Maintenance Due   Topic Date Due   • ZOSTER VACCINE (1 of 2) Never done   • ANNUAL WELLNESS VISIT  Never done       Objective     Vital Signs:   /78   Pulse 100   Temp 97.3 °F (36.3 °C)   Resp 20   Ht 188 cm (74\")   Wt 83 kg (183 lb)   SpO2 100%   BMI 23.50 kg/m²       Physical Exam  Vitals reviewed.   Constitutional:       General: He is not in acute distress.     Appearance: Normal appearance.   HENT:      Head: Normocephalic.      Right Ear: " Tympanic membrane normal.      Left Ear: Tympanic membrane normal.      Nose: Nose normal.      Mouth/Throat:      Pharynx: Oropharynx is clear. No posterior oropharyngeal erythema.   Eyes:      General: No scleral icterus.     Extraocular Movements: Extraocular movements intact.      Conjunctiva/sclera: Conjunctivae normal.      Pupils: Pupils are equal, round, and reactive to light.   Cardiovascular:      Rate and Rhythm: Normal rate and regular rhythm.      Pulses: Normal pulses.      Heart sounds: Normal heart sounds.   Pulmonary:      Effort: Pulmonary effort is normal.      Breath sounds: Normal breath sounds.   Abdominal:      General: Bowel sounds are normal.      Palpations: Abdomen is soft.   Musculoskeletal:      Right hand: Normal.      Left hand: Swelling and bony tenderness present. Decreased range of motion.        Arms:       Cervical back: Neck supple.      Comments: L middle index, middle, 4th fingers edema, mild erythema/bruising, tender to palpation, unable to bend middle finger   Skin:     General: Skin is warm and dry.      Findings: Bruising present.   Neurological:      Mental Status: He is alert and oriented to person, place, and time.   Psychiatric:         Mood and Affect: Mood normal.         Behavior: Behavior normal.         Thought Content: Thought content normal.         Judgment: Judgment normal.          Review of Systems   Constitutional: Negative for chills, fatigue and fever.   HENT: Negative for congestion, ear pain, sinus pressure and sore throat.    Eyes: Negative for blurred vision.   Respiratory: Negative for cough and shortness of breath.    Cardiovascular: Negative for chest pain, palpitations and leg swelling.   Gastrointestinal: Negative for abdominal pain, constipation, diarrhea, nausea, vomiting and GERD.   Musculoskeletal: Negative for arthralgias.        L hand pain/swelling    Skin: Positive for bruise. Negative for rash and skin lesions.   Neurological: Negative  for dizziness and headache.   Psychiatric/Behavioral: Negative for sleep disturbance, suicidal ideas and depressed mood. The patient is not nervous/anxious.         Result Review :     Common labs    Common Labsle 12/3/20 12/3/20 12/3/20 7/21/21 7/21/21 7/21/21    0858 0858 0858 0920 0920 0920   Glucose      82   Glucose  94       BUN  20    20   Creatinine  1.06    1.08   eGFR Non African Am      65   Sodium  138    139   Potassium  4.5    4.8   Chloride  100    102   Calcium  9.7    9.4   Albumin  4.1    4.00   Total Bilirubin  0.78    0.8   Alkaline Phosphatase  121    70   AST (SGOT)  30    23   ALT (SGPT)  29    16   WBC 7.47   5.34     Hemoglobin 14.1   13.3     Hematocrit 42.7   38.6     Platelets 294   218     Total Cholesterol     127    Total Cholesterol   95 (A)      Triglycerides   51  60    HDL Cholesterol   46  39 (A)    LDL Cholesterol    39 (A)  75    (A) Abnormal value       Comments are available for some flowsheets but are not being displayed.                     Assessment and Plan    Diagnoses and all orders for this visit:    1. Left hand pain (Primary)  Comments:  XR ordered   Increase rest of extremity   Ice 20 min TID   Tyl/Ibu UAD prn pain       2. Fall, initial encounter  Comments:  Discussed fall prevention  Recommend knee brace for stabalization     3. Essential hypertension  Assessment & Plan:  Hypertension is stable..  Continue current treatment regimen.  Dietary sodium restriction.  Regular aerobic exercise.  Continue current medications.  Blood pressure will be reassessed at the next regular appointment.          Follow Up {Instructions Charge Capture  Follow-up Communications :23}  Return if symptoms worsen or fail to improve.  Patient was given instructions and counseling regarding his condition or for health maintenance advice. Please see specific information pulled into the AVS if appropriate.